# Patient Record
(demographics unavailable — no encounter records)

---

## 2024-10-23 NOTE — DATA REVIEWED
[de-identified] : MRA brain non con 9/9/24 MRA brain non con 5/21/24 - [facility] [de-identified] : Diagnostic cerebral angiogram 7/31/24

## 2024-10-23 NOTE — HISTORY OF PRESENT ILLNESS
[de-identified] : HANNAH GA is a 34 year old female, previous patient of Dr. Rodriguez initially referred by neurologist Dr. Mcgowan. No significant PMH, current smoker. She underwent brain imaging in the context of left side numbness. 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 On 7/31/24, she underwent diagnostic cerebral angiography by Dr. 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 Decreased flow in the right MCA compared with the left. Left vertebral artery is dominant.  Today, patient presents to [facility] care. She was previously scheduled to undergo treatment of her aneurysm with flow diverting stent by Dr. Rodriguez on 10/29/24.

## 2024-10-23 NOTE — DATA REVIEWED
[de-identified] : MRA brain non con 9/9/24 MRA brain non con 5/21/24 - [facility] [de-identified] : Diagnostic cerebral angiogram 7/31/24

## 2024-10-23 NOTE — ASSESSMENT
[FreeTextEntry1] : IMPRESSION: 34F, previous patient of Dr. Rodriguez initially referred by neurologist Dr. Mcgowan. No significant PMH, current smoker. p/w left side numbness. Work-up with brain MRI/MRA 5/21/24 [facility] demonstrated a R P1 segment posterior cerebral artery aneurysm and R MCA stenosis. s/p dx angio 7/31/24 by Dr. Rodriguez demonstrating unruptured 2.6 x 2.2mm R P1 segment posterior cerebral artery aneurysm with 1.8mm neck, and R MCA stenosis. Interval MRA brain 9/9/24 with significant stenosis of the M1 segment of the R MCA, but adequate flow on NOVA. She is currently on ASA 81 daily.  Patient presents today to establish care. She was previously scheduled to undergo treatment of her aneurysm with flow diverting stent by Dr. Rodriguez on 10/29/24. Explained her aneurysm is not related to her left sided numbness episodes. She has not followed up with neurologist, Dr. Mcgowan.   Discussed options for the aneurysm including observation since it's small size, or endovascular embolization with flow diversion or coils. The risks, benefits, alternatives, complications and personnel associated with the procedure were discussed with the patient and family in great detail. This requires starting dual antiplatelet therapy with aspirin 81mg daily and Brilinta 90mg BID 5-7 days before the procedure and maintained for at least 6 months. Embo has risk of 5-8%, high risk area and small size.      PLAN: Plan to present case in NeuroIR conference regarding addressing the right MCA stenosis prior to the aneurysm given her episodes of left sided numbness/weakness Continue aspirin 81mg daily After above, will reach out to schedule cerebral angiogram and embolization of aneurysm  Would start Brilinta 90mg BID 5-7 days prior to procedure in addition to aspirin

## 2024-10-23 NOTE — HISTORY OF PRESENT ILLNESS
[de-identified] : HANNAH GA is a 34 year old female, previous patient of Dr. Rodriguez initially referred by neurologist Dr. Mcgowan. No significant PMH, current smoker. She underwent brain imaging in the context of left side numbness. 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 On 7/31/24, she underwent diagnostic cerebral angiography by Dr. 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 Decreased flow in the right MCA compared with the left. Left vertebral artery is dominant.  Today, patient presents to [facility] care. She was previously scheduled to undergo treatment of her aneurysm with flow diverting stent by Dr. Rodriguez on 10/29/24.

## 2024-10-23 NOTE — REASON FOR VISIT
[New Patient Visit] : a new patient visit [Spouse] : spouse [FreeTextEntry1] : Previous patient of Dr. Rodriguez. Presents to establish neurosurgical care.  s/p Diagnostic cerebral angiogram 7/31/24

## 2025-01-15 NOTE — ASSESSMENT
[FreeTextEntry1] : IMPRESSION: 34F, previous patient of Dr. Rodriguez initially referred by neurologist Dr. Mcgowan. No significant PMH, current smoker. p/w left side numbness. Work-up with brain MRI/MRA 5/21/24 [facility] demonstrated a R P1 segment posterior cerebral artery aneurysm and R MCA stenosis. s/p dx angio 7/31/24 by Dr. Rodriguez demonstrating unruptured 2.6 x 2.2mm R P1 segment posterior cerebral artery aneurysm with 1.8mm neck, and R MCA stenosis. Interval MRA brain 9/9/24 with significant stenosis of the M1 segment of the R MCA, but adequate flow on NOVA. She is currently on ASA 81 daily.  She was previously scheduled to undergo treatment of her aneurysm with flow diverting stent by Dr. Rodriguez on 10/29/24. Explained her aneurysm is not related to her left sided numbness episodes. She has not followed up with neurologist, Dr. Mcgowan.   Discussed options for the aneurysm including observation since it's small size, or endovascular embolization with flow diversion or coils. The risks, benefits, alternatives, complications and personnel associated with the procedure were discussed with the patient and family in great detail. This requires starting dual antiplatelet therapy with aspirin 81mg daily and Brilinta 90mg BID 5-7 days before the procedure and maintained for at least 6 months. Embo has risk of 5-8%, high risk area and small size.   Case discussed in neuroIR conference and due to lack of stroke or clear symptoms related to the MCA stenosis, no endovascular intervention is indicated. Treatment of the aneurysm was recommended due to her young age and presence in the posterior circulation. Flow diversion is favored, in order to avoid coils in the aneurysm which could put the  branch at risk.     PLAN: Continue aspirin 81mg daily Will schedule embolization of aneurysm in 3-6 months once [place] baby flow diverting stent is available  Start Brilinta 90mg BID 5-7 days prior to procedure in addition to aspirin 81 daily

## 2025-01-15 NOTE — HISTORY OF PRESENT ILLNESS
[FreeTextEntry1] : HANNAH GA is a 34 year old female, previous patient of Dr. Rodriguez initially referred by neurologist Dr. Mcgowan. No significant PMH, current smoker. She underwent brain imaging in the context of left side numbness. 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 On 7/31/24, she underwent diagnostic cerebral angiography by Dr. 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 Decreased flow in the right MCA compared with the left. Left vertebral artery is dominant.  1/15/25: pt arrives via [facility] to discuss treatment options.  Reports LUE numbness persists and weakness at times.   Remains on ASA 81 daily.  10/23/24: Today, patient presents to establish care. She was previously scheduled to undergo treatment of her aneurysm with flow diverting stent by Dr. Rodriguez on 10/29/24.